# Patient Record
Sex: MALE | Employment: FULL TIME | URBAN - METROPOLITAN AREA
[De-identification: names, ages, dates, MRNs, and addresses within clinical notes are randomized per-mention and may not be internally consistent; named-entity substitution may affect disease eponyms.]

---

## 2017-04-18 ENCOUNTER — OFFICE VISIT (OUTPATIENT)
Dept: FAMILY MEDICINE CLINIC | Facility: CLINIC | Age: 27
End: 2017-04-18

## 2017-04-18 VITALS
SYSTOLIC BLOOD PRESSURE: 118 MMHG | DIASTOLIC BLOOD PRESSURE: 80 MMHG | HEIGHT: 66.5 IN | WEIGHT: 203 LBS | RESPIRATION RATE: 20 BRPM | TEMPERATURE: 98 F | HEART RATE: 99 BPM | BODY MASS INDEX: 32.24 KG/M2 | OXYGEN SATURATION: 99 %

## 2017-04-18 DIAGNOSIS — J01.40 ACUTE PANSINUSITIS, RECURRENCE NOT SPECIFIED: ICD-10-CM

## 2017-04-18 DIAGNOSIS — J30.2 SEASONAL ALLERGIC RHINITIS, UNSPECIFIED ALLERGIC RHINITIS TRIGGER: Primary | ICD-10-CM

## 2017-04-18 PROCEDURE — 99213 OFFICE O/P EST LOW 20 MIN: CPT | Performed by: NURSE PRACTITIONER

## 2017-04-18 RX ORDER — AMOXICILLIN AND CLAVULANATE POTASSIUM 875; 125 MG/1; MG/1
1 TABLET, FILM COATED ORAL 2 TIMES DAILY
Qty: 20 TABLET | Refills: 0 | Status: SHIPPED | OUTPATIENT
Start: 2017-04-18 | End: 2017-04-28

## 2017-04-18 NOTE — PATIENT INSTRUCTIONS
Humidifier in room  Sleep propped  Push fluids  Limit dairy  Continue Claritin D  Flonase 2 sprays each nostril daily  Start antibiotics in a few days for worsening symptoms    Allergic Rhinitis  Allergic rhinitis is an allergic reaction that affects the · Fix water leaks  Mold:  · Keep humidity low by using a dehumidifier or air conditioner. Keep the dehumidifier and air conditioner clean and free of mold. · Clean moldy areas with bleach and water. In general:  · Vacuum once or twice a week.  If possible Allergens irritate your eyes, including the lining of the conjunctiva. This causes eyes to become red, itchy, puffy, and watery.   Ear problems  The eustachian tube connects the middle ear to nasal passages.  Allergies can block this tube, and make the ears · Change air conditioner filters often. · Decongestant pills and sprays reduce tissue swelling and watery discharge. Overuse of nasal decongestant sprays may make symptoms worse.  Do not use these more often than recommended. Sometimes you can experience a · You have asthma and your asthma symptoms do not respond to the usual doses of your medicine  · Cough with lots of colored sputum (mucus)  · Fever of 100.4°F (38°C) or higher or as directed by the healthcare provider  Call 911 if any of these occur:  · Tr · Over-the-counter decongestants may be used unless a similar medicine was prescribed. Nasal sprays work the fastest. Use one that contains phenylephrine or oxymetazoline. First blow the nose gently. Then use the spray.  Do not use these medicines more ofte © 9159-4022 [address], [address] [place]. All rights reserved. This information is not intended as a substitute for professional medical care. Always follow your healthcare professional's instructions.

## 2017-04-19 NOTE — PROGRESS NOTES
CHIEF COMPLAINT:   Patient presents with:  Sinus Problem: s/s for 1 week  Post Nasal Drip  Sore Throat      HPI:   Karol Santiago is a 23year old male who presents for cold symptoms for  1  weeks.  Symptoms have progressed into sinus congestion and been SKIN: no rashes or abnormal skin lesions  HEENT: See HPI. LUNGS: denies shortness of breath or wheezing, See HPI  CARDIOVASCULAR: denies chest pain or palpitations   GI: denies N/V/C or abdominal pain  NEURO: + sinus headaches.   No numbness or tingling Risks, benefits, side effects of medication addressed and explained.     Patient Instructions     Humidifier in room  Sleep propped  Push fluids  Limit dairy  Continue Claritin D  Flonase 2 sprays each nostril daily  Start antibiotics in a few days for wors Cockroaches:  · Store food in sealed containers. · Remove garbage from the home promptly. · Fix water leaks  Mold:  · Keep humidity low by using a dehumidifier or air conditioner. Keep the dehumidifier and air conditioner clean and free of mold.   · Clean Fluid may be trapped in the sinuses. Bacteria may grow in trapped fluid. This causes sinus infection (sinusitis). Conjunctivitis  Allergens irritate your eyes, including the lining of the conjunctiva.  This causes eyes to become red, itchy, puffy, and wate · Stay indoors on windy days of pollen season.   · Keep windows and doors closed. Use air conditioning instead in your home and car. This filters the air. · Change air conditioner filters often.   · Decongestant pills and sprays reduce tissue swelling and When to seek medical advice  Call your healthcare provider for any of the following:  · Facial, ear or sinus pain; colored drainage from the nose  · Severe headache  · You have asthma and your asthma symptoms do not respond to the usual doses of your medic · An expectorant containing guaifenesin may help thin the mucus and promote drainage from the sinuses. · Over-the-counter decongestants may be used unless a similar medicine was prescribed.  Nasal sprays work the fastest. Use one that contains phenylephrin © 7196-8350 The [address] [place] [address], [address]. All rights reserved. This information is not intended as a substitute for professional medical care. Always follow your healthcare professional's instructions.             The

## 2017-05-11 ENCOUNTER — OFFICE VISIT (OUTPATIENT)
Dept: FAMILY MEDICINE CLINIC | Facility: CLINIC | Age: 27
End: 2017-05-11

## 2017-05-11 VITALS
TEMPERATURE: 98 F | OXYGEN SATURATION: 100 % | WEIGHT: 207 LBS | DIASTOLIC BLOOD PRESSURE: 76 MMHG | HEIGHT: 66.5 IN | HEART RATE: 82 BPM | BODY MASS INDEX: 32.87 KG/M2 | SYSTOLIC BLOOD PRESSURE: 118 MMHG | RESPIRATION RATE: 16 BRPM

## 2017-05-11 DIAGNOSIS — Z76.0 MEDICATION REFILL: ICD-10-CM

## 2017-05-11 DIAGNOSIS — J45.20 CHRONIC ASTHMA, MILD INTERMITTENT, UNCOMPLICATED: Primary | ICD-10-CM

## 2017-05-11 PROCEDURE — 99213 OFFICE O/P EST LOW 20 MIN: CPT | Performed by: FAMILY MEDICINE

## 2017-05-11 RX ORDER — ALBUTEROL SULFATE 90 UG/1
2 AEROSOL, METERED RESPIRATORY (INHALATION) EVERY 4 HOURS PRN
Qty: 1 INHALER | Refills: 5 | Status: SHIPPED | OUTPATIENT
Start: 2017-05-11

## 2017-05-11 RX ORDER — MONTELUKAST SODIUM 10 MG/1
10 TABLET ORAL DAILY
Qty: 90 TABLET | Refills: 1 | Status: SHIPPED | OUTPATIENT
Start: 2017-05-11 | End: 2018-03-23

## 2017-05-12 NOTE — PATIENT INSTRUCTIONS
Charlie you were seen for asthma and allergy management. Welcome back from [place]. See me for a CPE.  Take care, Dr. Penelope Moran can do a lot to manage your asthma and improve your quality of life. You will need to work wit

## 2017-05-12 NOTE — PROGRESS NOTES
Kel Douglass is a 23year old male who has not been seen in a while as he was living in [place] and returned to [place] for work. He states he has asthma history. He has allergies and needs Albuterol and Singulair refilled.   No SOB or cough or wheezes anxiety  EXAM:   /76 mmHg  Pulse 82  Temp(Src) 98 °F (36.7 °C) (Temporal)  Resp 16  Ht 66.5\"  Wt 207 lb  BMI 32.91 kg/m2  SpO2 100%  GENERAL: well developed, well nourished,in no apparent distress  SKIN: no rashes,no suspicious lesions  HEENT: atrau

## 2018-03-20 ENCOUNTER — OFFICE VISIT (OUTPATIENT)
Dept: FAMILY MEDICINE CLINIC | Facility: CLINIC | Age: 28
End: 2018-03-20

## 2018-03-20 ENCOUNTER — HOSPITAL ENCOUNTER (OUTPATIENT)
Dept: GENERAL RADIOLOGY | Age: 28
Discharge: HOME | End: 2018-03-20
Attending: FAMILY MEDICINE
Payer: COMMERCIAL

## 2018-03-20 VITALS
DIASTOLIC BLOOD PRESSURE: 88 MMHG | TEMPERATURE: 98 F | SYSTOLIC BLOOD PRESSURE: 120 MMHG | HEART RATE: 93 BPM | BODY MASS INDEX: 33.99 KG/M2 | OXYGEN SATURATION: 99 % | RESPIRATION RATE: 12 BRPM | WEIGHT: 214 LBS | HEIGHT: 66.5 IN

## 2018-03-20 DIAGNOSIS — R07.9 CHEST PAIN, UNSPECIFIED TYPE: Primary | ICD-10-CM

## 2018-03-20 DIAGNOSIS — R05.8 PRODUCTIVE COUGH: ICD-10-CM

## 2018-03-20 DIAGNOSIS — R07.9 CHEST PAIN, UNSPECIFIED TYPE: ICD-10-CM

## 2018-03-20 PROCEDURE — 71046 X-RAY EXAM CHEST 2 VIEWS: CPT | Performed by: FAMILY MEDICINE

## 2018-03-20 PROCEDURE — 99214 OFFICE O/P EST MOD 30 MIN: CPT | Performed by: FAMILY MEDICINE

## 2018-03-20 RX ORDER — ALBUTEROL SULFATE 90 UG/1
2 AEROSOL, METERED RESPIRATORY (INHALATION) EVERY 4 HOURS PRN
Qty: 1 INHALER | Refills: 0 | Status: SHIPPED | OUTPATIENT
Start: 2018-03-20 | End: 2018-03-23

## 2018-03-20 RX ORDER — AMOXICILLIN AND CLAVULANATE POTASSIUM 500; 125 MG/1; MG/1
1 TABLET, FILM COATED ORAL 2 TIMES DAILY
Qty: 14 TABLET | Refills: 0 | Status: SHIPPED | OUTPATIENT
Start: 2018-03-20 | End: 2018-06-22

## 2018-03-23 ENCOUNTER — OFFICE VISIT (OUTPATIENT)
Dept: FAMILY MEDICINE CLINIC | Facility: CLINIC | Age: 28
End: 2018-03-23

## 2018-03-23 VITALS
OXYGEN SATURATION: 99 % | BODY MASS INDEX: 34 KG/M2 | RESPIRATION RATE: 22 BRPM | HEART RATE: 92 BPM | SYSTOLIC BLOOD PRESSURE: 126 MMHG | DIASTOLIC BLOOD PRESSURE: 84 MMHG | TEMPERATURE: 98 F | WEIGHT: 214 LBS

## 2018-03-23 DIAGNOSIS — J45.20 CHRONIC ASTHMA, MILD INTERMITTENT, UNCOMPLICATED: ICD-10-CM

## 2018-03-23 DIAGNOSIS — Z76.0 MEDICATION REFILL: ICD-10-CM

## 2018-03-23 PROCEDURE — 99214 OFFICE O/P EST MOD 30 MIN: CPT | Performed by: FAMILY MEDICINE

## 2018-03-23 RX ORDER — MONTELUKAST SODIUM 10 MG/1
10 TABLET ORAL DAILY
Qty: 90 TABLET | Refills: 0 | Status: SHIPPED | OUTPATIENT
Start: 2018-03-23 | End: 2018-06-22

## 2018-03-23 RX ORDER — PREDNISONE 20 MG/1
20 TABLET ORAL 2 TIMES DAILY
Qty: 10 TABLET | Refills: 0 | Status: SHIPPED | OUTPATIENT
Start: 2018-03-23 | End: 2018-03-28

## 2018-03-23 NOTE — PROGRESS NOTES
HPI:    Patient ID: Noemi Irwin is a 33year old male. HPI  Patient presents with:   Follow - Up: follow up, had chest x-ray,   Cough: coughing all the time, dry cough, back hurts from coughing      Review of Systems  Negative except for the above still has pain but he thinks it might be from coughing too much. We will try prednisone, refill Singulair as has taken it in the past for allergies and his nose has been really stuffy over the past day or so.   Patient to follow-up with me in 1 week if n

## 2018-03-25 NOTE — PROGRESS NOTES
HPI:    Patient ID: Ganesh Slaughter is a 33year old male.     HPI  Patient presents with:  Chest Pain: saturday; right side radiating underneath right armpit  Asthma: updated ACT/AAP; pt is currently not taking any medications      Review of Systems  Exce sign, no facial weakness noted, coordination intact, gait is normal.   MS: Mild tenderness on palpation of the right anterior chest wall and axillary region no mass palpable         ASSESSMENT/PLAN:   Chest pain, unspecified type  (primary encounter diagno

## 2018-06-22 ENCOUNTER — OFFICE VISIT (OUTPATIENT)
Dept: FAMILY MEDICINE CLINIC | Facility: CLINIC | Age: 28
End: 2018-06-22

## 2018-06-22 DIAGNOSIS — J45.20 CHRONIC ASTHMA, MILD INTERMITTENT, UNCOMPLICATED: ICD-10-CM

## 2018-06-22 DIAGNOSIS — M25.551 PAIN OF RIGHT HIP JOINT: ICD-10-CM

## 2018-06-22 DIAGNOSIS — Z76.0 MEDICATION REFILL: ICD-10-CM

## 2018-06-22 DIAGNOSIS — Z00.00 ROUTINE ADULT HEALTH MAINTENANCE: Primary | ICD-10-CM

## 2018-06-22 PROCEDURE — 99213 OFFICE O/P EST LOW 20 MIN: CPT | Performed by: FAMILY MEDICINE

## 2018-06-22 PROCEDURE — 99395 PREV VISIT EST AGE 18-39: CPT | Performed by: FAMILY MEDICINE

## 2018-06-22 RX ORDER — MONTELUKAST SODIUM 10 MG/1
10 TABLET ORAL DAILY
Qty: 90 TABLET | Refills: 0 | Status: SHIPPED | OUTPATIENT
Start: 2018-06-22
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
## 2018-06-23 ENCOUNTER — APPOINTMENT (OUTPATIENT)
Dept: LAB | Age: 28
End: 2018-06-23
Attending: FAMILY MEDICINE
Payer: COMMERCIAL

## 2018-06-23 DIAGNOSIS — Z00.00 ROUTINE ADULT HEALTH MAINTENANCE: ICD-10-CM

## 2018-06-23 PROCEDURE — 80061 LIPID PANEL: CPT

## 2018-06-23 PROCEDURE — 36415 COLL VENOUS BLD VENIPUNCTURE: CPT

## 2018-06-23 PROCEDURE — 80053 COMPREHEN METABOLIC PANEL: CPT

## 2018-06-23 PROCEDURE — 85027 COMPLETE CBC AUTOMATED: CPT

## 2018-06-25 DIAGNOSIS — R79.89 LFTS ABNORMAL: Primary | ICD-10-CM

## (undated) NOTE — Clinical Note
Dear Dr. Levon Carter,       Thank you for referring Anna Decker to the [facility].   Sincerely,  SANDRA Colon

## (undated) NOTE — MR AVS SNAPSHOT
[facility] [address] [place] [address] [address] [facility]  Sherie Earl [address] [id number] [phone]               Thank you for choosing us for your health care visit with Marquis Kaelyn DO.   We are glad to serve you and happy to provide you wi Peak flow monitoring helps measure how open your airways are. Taking medication helps you control your asthma and relieve symptoms when they occur. Using an Asthma Action Plan will help you keep track of and respond to asthma symptoms.    Avoiding tri * Notice: This list has 2 medication(s) that are the same as other medications prescribed for you. Read the directions carefully, and ask your doctor or other care provider to review them with you.          Where to Get Your Medications      These medicat

## (undated) NOTE — MR AVS SNAPSHOT
[facility]  [address] [place]  [phone]               Thank you for choosing us for your health care visit with Yoanna Lindo NP. We are glad to serve you and happy to provide you with this summary of your visit.   Ple allergies may also affect your breathing and trigger a condition called asthma.   Tests can be done to see what allergens are affecting you. You may be referred to an allergy specialist for testing and further evaluation.   Home care  The healthcare provide Call 911 right away if you have:  · Trouble breathing  · Hives (raised red bumps)  · Severe swelling of the face or severe itching of the eyes or mouth  Date Last Reviewed: 4/26/2015  © 0565-3951 The [address] [place] [address], © 0675-3038 [address], [address] [place]. All rights reserved. This information is not intended as a substitute for professional medical care. Always follow your healthcare professional's instructions.         Amador Pérez · Steroid nasal sprays or oral steroids may also be prescribed for more severe symptoms. These help to reduce the local inflammation that can add to the allergic response. · If you have asthma, pollen season may make your asthma symptoms worse.  It is impo substitute for professional medical care. Always follow your healthcare professional's instructions. Sinusitis (No Antibiotics)    The sinuses are air-filled spaces within the bones of the face.  They connect to the inside of the nose. Sinusitis is a ever had a stomach ulcer, talk with your doctor before using these medicines. Aspirin should never be used in anyone under 25years of age who is ill with a fever.  It may cause severe liver damage.)  · Use nasal rinses or irrigation as instructed by your h Take 1 tablet by mouth 2 (two) times daily. Commonly known as:  AUGMENTIN           * Montelukast Sodium 10 MG Tabs   Take 1 tablet (10 mg total) by mouth daily.    Commonly known as:  SINGULAIR           * Montelukast Sodium 10 MG Tabs   Take 10 mg by mo EAT THESE FOODS MORE OFTEN: EAT THESE FOODS LESS OFTEN:   Make half your plate fruits and vegetables Highly refined, white starches including white bread, rice and pasta   Eat plenty of protein, keep the fat content low Sugars:  sodas and sports drinks, ca

## (undated) NOTE — LETTER
ASTHMA ACTION PLAN for Sanam Bloodgood     : 1990     Date: 3/20/2018  Provider:  Dave Ruiz MD  Phone for doctor or clinic: [address] [facility], [address] [facility] , [address] [facility] [place]  [phone]